# Patient Record
Sex: MALE | Race: ASIAN
[De-identification: names, ages, dates, MRNs, and addresses within clinical notes are randomized per-mention and may not be internally consistent; named-entity substitution may affect disease eponyms.]

---

## 2021-02-20 ENCOUNTER — HOSPITAL ENCOUNTER (EMERGENCY)
Dept: HOSPITAL 72 - EMR | Age: 66
Discharge: ANOTHER HOSPITAL | End: 2021-02-20
Payer: COMMERCIAL

## 2021-02-20 VITALS — DIASTOLIC BLOOD PRESSURE: 75 MMHG | SYSTOLIC BLOOD PRESSURE: 118 MMHG

## 2021-02-20 VITALS — SYSTOLIC BLOOD PRESSURE: 120 MMHG | DIASTOLIC BLOOD PRESSURE: 73 MMHG

## 2021-02-20 VITALS — BODY MASS INDEX: 20.04 KG/M2 | HEIGHT: 70 IN | WEIGHT: 140 LBS

## 2021-02-20 VITALS — DIASTOLIC BLOOD PRESSURE: 66 MMHG | SYSTOLIC BLOOD PRESSURE: 115 MMHG

## 2021-02-20 DIAGNOSIS — N13.8: Primary | ICD-10-CM

## 2021-02-20 DIAGNOSIS — N20.0: ICD-10-CM

## 2021-02-20 LAB
ADD MANUAL DIFF: NO
ALBUMIN SERPL-MCNC: 3.2 G/DL (ref 3.4–5)
ALBUMIN/GLOB SERPL: 0.8 {RATIO} (ref 1–2.7)
ALP SERPL-CCNC: 58 U/L (ref 46–116)
ALT SERPL-CCNC: 24 U/L (ref 12–78)
ANION GAP SERPL CALC-SCNC: 9 MMOL/L (ref 5–15)
APPEARANCE UR: (no result)
APTT PPP: (no result) S
AST SERPL-CCNC: 18 U/L (ref 15–37)
BASOPHILS NFR BLD AUTO: 0.8 % (ref 0–2)
BILIRUB SERPL-MCNC: 0.9 MG/DL (ref 0.2–1)
BUN SERPL-MCNC: 19 MG/DL (ref 7–18)
CALCIUM SERPL-MCNC: 8.7 MG/DL (ref 8.5–10.1)
CHLORIDE SERPL-SCNC: 107 MMOL/L (ref 98–107)
CO2 SERPL-SCNC: 28 MMOL/L (ref 21–32)
CREAT SERPL-MCNC: 1 MG/DL (ref 0.55–1.3)
EOSINOPHIL NFR BLD AUTO: 1.2 % (ref 0–3)
ERYTHROCYTE [DISTWIDTH] IN BLOOD BY AUTOMATED COUNT: 13.9 % (ref 11.6–14.8)
GLOBULIN SER-MCNC: 3.9 G/DL
GLUCOSE UR STRIP-MCNC: NEGATIVE MG/DL
HCT VFR BLD CALC: 41.7 % (ref 42–52)
HGB BLD-MCNC: 13.5 G/DL (ref 14.2–18)
KETONES UR QL STRIP: (no result)
LEUKOCYTE ESTERASE UR QL STRIP: (no result)
LYMPHOCYTES NFR BLD AUTO: 22.1 % (ref 20–45)
MCV RBC AUTO: 88 FL (ref 80–99)
MONOCYTES NFR BLD AUTO: 6 % (ref 1–10)
NEUTROPHILS NFR BLD AUTO: 70 % (ref 45–75)
NITRITE UR QL STRIP: NEGATIVE
PH UR STRIP: 6 [PH] (ref 4.5–8)
PLATELET # BLD: 259 K/UL (ref 150–450)
POTASSIUM SERPL-SCNC: 3.9 MMOL/L (ref 3.5–5.1)
PROT UR QL STRIP: (no result)
RBC # BLD AUTO: 4.75 M/UL (ref 4.7–6.1)
SODIUM SERPL-SCNC: 143 MMOL/L (ref 136–145)
SP GR UR STRIP: 1.02 (ref 1–1.03)
UROBILINOGEN UR-MCNC: 1 MG/DL (ref 0–1)
WBC # BLD AUTO: 11.7 K/UL (ref 4.8–10.8)

## 2021-02-20 PROCEDURE — 96374 THER/PROPH/DIAG INJ IV PUSH: CPT

## 2021-02-20 PROCEDURE — 99284 EMERGENCY DEPT VISIT MOD MDM: CPT

## 2021-02-20 PROCEDURE — 81003 URINALYSIS AUTO W/O SCOPE: CPT

## 2021-02-20 PROCEDURE — 83690 ASSAY OF LIPASE: CPT

## 2021-02-20 PROCEDURE — 85025 COMPLETE CBC W/AUTO DIFF WBC: CPT

## 2021-02-20 PROCEDURE — 96375 TX/PRO/DX INJ NEW DRUG ADDON: CPT

## 2021-02-20 PROCEDURE — 74177 CT ABD & PELVIS W/CONTRAST: CPT

## 2021-02-20 PROCEDURE — 36415 COLL VENOUS BLD VENIPUNCTURE: CPT

## 2021-02-20 PROCEDURE — 96376 TX/PRO/DX INJ SAME DRUG ADON: CPT

## 2021-02-20 PROCEDURE — 80053 COMPREHEN METABOLIC PANEL: CPT

## 2021-02-20 PROCEDURE — 96361 HYDRATE IV INFUSION ADD-ON: CPT

## 2021-02-20 NOTE — DIAGNOSTIC IMAGING REPORT
EXAM:

  CT Abdomen and Pelvis With Intravenous Contrast

 

CLINICAL HISTORY:

  ABD PAIN

 

TECHNIQUE:

  Axial computed tomography images of the abdomen and pelvis with 

intravenous contrast.  CTDI is 4.2 mGy and DLP is 209.7 mGy-cm.  One or 

more of the following dose reduction techniques were used: automated 

exposure control, adjustment of the mA and/or kV according to patient 

size, use of iterative reconstruction technique.

 

COMPARISON:

  No relevant prior studies available.

 

FINDINGS:

  Lung bases:  Atelectasis at the lung bases.  Cardiomegaly.

  Mediastinum:  Small hiatal hernia.

 

 ABDOMEN:

  Liver:  Moderate periportal edema, correlate with LFTs to exclude 

hepatitis.  This degree of periportal edema is unlikely secondary to 

overhydration.

  Gallbladder and bile ducts:  Unremarkable.  No calcified stones.  No 

ductal dilation.

  Pancreas:  Unremarkable.  No mass.  No ductal dilation.

  Spleen:  Unremarkable.  No splenomegaly.

  Adrenals:  Unremarkable.  No mass.

  Kidneys and ureters:  Obstructing 4 mm right proximal ureter calculus, 

located 4.3 cm distal to the right renal pelvis.  Mild delayed nephrogram 

of the right kidney with mild fullness of the right collecting system.  

Mild perinephric fluid for which mild forniceal rupture is not excluded.

 

  Nonobstructing 4 mm right lower pole renal calculus.  

 

  No left side hydronephrosis.  Left simple renal cyst measures 2.5 x 1.5 

cm.

 

  Stomach and bowel:  No small bowel obstruction.  No acute 

diverticulitis.

 

 PELVIS:

  Appendix:  No acute appendicitis.

  Bladder:  Unremarkable.  No mass.

  Reproductive:  Unremarkable as visualized.

 

 ABDOMEN and PELVIS:

  Intraperitoneal space:  Unremarkable.  No free air.  No significant 

fluid collection.

  Bones/joints:  Degenerative changes of the spine.  No acute fracture.  

No dislocation.

  Soft tissues:  Unremarkable.

  Vasculature:  Atherosclerotic calcifications of the aorta.  No 

abdominal aortic aneurysm.

  Lymph nodes:  Unremarkable.  No enlarged lymph nodes.

 

IMPRESSION:     

1.  Obstructing 4 mm RIGHT proximal ureter calculus, located 4.3 cm 

distal to the right renal pelvis.  Mild delayed nephrogram of the right 

kidney with mild fullness of the right collecting system.  Mild 

perinephric fluid for which mild forniceal rupture is not excluded.

 

     Nonobstructing 4 mm RIGHT lower pole renal calculus.  

 

2.  Moderate periportal edema, correlate with LFTs to exclude hepatitis.  

This degree of periportal edema is unlikely secondary to overhydration.

## 2021-02-20 NOTE — EMERGENCY ROOM REPORT
History of Present Illness


General


Chief Complaint:  Abdominal Pain


Source:  Patient





Present Illness


HPI


Disclaimer: Please note that this report is being documented using [employer] 

technology. This can lead to erroneous entry secondary to incorrect 

interpretation by the dictating instrument.





HPI: 65-year-old male no medical history presents for evaluation of abdominal 

pain.  He went to bed last night noted dark appearance to his urine without 

cloudiness or foul smell.  He denied flank pain or other discomfort at that 

time.  He awoke with right upper quadrant abdominal pain that does not radiate. 

No prior history of surgery.  Reports one episode of emesis prior to arrival.  

Denies fever diarrhea.  Denies dysuria.  Denies chest pain, palpitations, 

shortness of breath.  Does not take any medications.  Does not use drugs or 

alcohol.  Pain is getting worse.


 


PMH: Denied


 


PSH: Denied


 


Allergies: Denied


 


Social Hx: Denies drug, alcohol or tobacco use


Allergies:  


Coded Allergies:  


     No Known Allergies (Unverified , 2/20/21)





COVID-19 Screening


Contact w/high risk pt:  No


Experienced COVID-19 symptoms?:  No


COVID-19 Testing performed PTA:  No





Nursing Documentation-PMH


Past Medical History:  No Stated History





Review of Systems


All Other Systems:  negative except mentioned in HPI





Physical Exam





Vital Signs








  Date Time  Temp Pulse Resp B/P (MAP) Pulse Ox O2 Delivery O2 Flow Rate FiO2


 


2/20/21 07:21 98.2 65 17 129/77 (94) 96 Room Air  





 





General: Awake and alert, appears uncomfortable


HEENT: NC/AT. EOMI. 


Cardiovascular: RRR.  S1 and S2 normal.  No murmur appreciated


Resp: Normal work of breathing. No cough, wheezing or crackles appreciated


Abdomen: Abdomen is soft, nondistended.  Tender palpation of the right upper 

quadrant with positive Gomez's tenderness and mild epigastric tenderness.  No 

tenderness in the left upper quadrant, right lower quadrant, left lower quadrant

or suprapubic or periumbilical region.  No CVA tenderness.


Skin: Intact.  No abrasions, laceration or rash over the exposed skin


MSK: Normal tone and bulk. Moving all extremities.  No obvious deformity.


Neuro: Awake and alert.  Mentating appropriately.





Medical Decision Making


Diagnostic Impression:  


   Primary Impression:  


   Obstructive nephropathy


   Additional Impression:  


   Nephrolithiasis


ER Course


65-year-old male presenting with abdominal pain.  Differential includes is not 

limited to gastritis, gastroenteritis, pancreatitis, cholecystitis, 

choledocholithiasis, nephrolithiasis, appendicitis, bowel obstruction, UTI, 

pyelonephritis, mesenteric ischemia among others.  IV fluids, antiemetics, pain 

medication started.  Patient placed on monitor. CT scan concerning for obstruct

kayley uropathy secondary to 4 mm stone in the right ureter. Hydronephrosis seen. 

Patient require admission. Additional antiemetics and pain medication provided. 

Given tamsulosin. Urine does not appear infectious. Patient will be transferred 

to [facility]. Dr. Torres accepting physician.





Laboratory Tests








Test


 2/20/21


07:20 2/20/21


08:00


 


White Blood Count


 11.7 K/UL


(4.8-10.8)  H 





 


Red Blood Count


 4.75 M/UL


(4.70-6.10) 





 


Hemoglobin


 13.5 G/DL


(14.2-18.0)  L 





 


Hematocrit


 41.7 %


(42.0-52.0)  L 





 


Mean Corpuscular Volume 88 FL (80-99)   


 


Mean Corpuscular Hemoglobin


 28.5 PG


(27.0-31.0) 





 


Mean Corpuscular Hemoglobin


Concent 32.4 G/DL


(32.0-36.0) 





 


Red Cell Distribution Width


 13.9 %


(11.6-14.8) 





 


Platelet Count


 259 K/UL


(150-450) 





 


Mean Platelet Volume


 7.0 FL


(6.5-10.1) 





 


Neutrophils (%) (Auto)


 70.0 %


(45.0-75.0) 





 


Lymphocytes (%) (Auto)


 22.1 %


(20.0-45.0) 





 


Monocytes (%) (Auto)


 6.0 %


(1.0-10.0) 





 


Eosinophils (%) (Auto)


 1.2 %


(0.0-3.0) 





 


Basophils (%) (Auto)


 0.8 %


(0.0-2.0) 





 


Sodium Level


 143 MMOL/L


(136-145) 





 


Potassium Level


 3.9 MMOL/L


(3.5-5.1) 





 


Chloride Level


 107 MMOL/L


() 





 


Carbon Dioxide Level


 28 MMOL/L


(21-32) 





 


Anion Gap


 9 mmol/L


(5-15) 





 


Blood Urea Nitrogen


 19 mg/dL


(7-18)  H 





 


Creatinine


 1.0 MG/DL


(0.55-1.30) 





 


Estimated Glomerular


Filtration Rate > 60 mL/min


(>60) 





 


Glucose Level


 159 MG/DL


()  H 





 


Calcium Level


 8.7 MG/DL


(8.5-10.1) 





 


Total Bilirubin


 0.9 MG/DL


(0.2-1.0) 





 


Aspartate Amino Transferase


(AST) 18 U/L (15-37)


 





 


Alanine Aminotransferase (ALT)


 24 U/L (12-78)


 





 


Alkaline Phosphatase


 58 U/L


() 





 


Total Protein


 7.1 G/DL


(6.4-8.2) 





 


Albumin


 3.2 G/DL


(3.4-5.0)  L 





 


Globulin 3.9 g/dL   


 


Albumin/Globulin Ratio


 0.8 (1.0-2.7)


L 





 


Lipase


 132 U/L


() 





 


Urine Color  Brown  


 


Urine Appearance


 


 Slightly


cloudy


 


Urine pH  6 (4.5-8.0)  


 


Urine Specific Gravity


 


 1.020


(1.005-1.035)


 


Urine Protein


 


 3+ (NEGATIVE)


H


 


Urine Glucose (UA)


 


 Negative


(NEGATIVE)


 


Urine Ketones


 


 1+ (NEGATIVE)


H


 


Urine Blood


 


 5+ (NEGATIVE)


H


 


Urine Nitrite


 


 Negative


(NEGATIVE)


 


Urine Bilirubin


 


 Negative


(NEGATIVE)


 


Urine Urobilinogen


 


 1 MG/DL


(0.0-1.0)  H


 


Urine Leukocyte Esterase


 


 1+ (NEGATIVE)


H


 


Urine RBC


 


 Tntc /HPF (0 -


0)  H


 


Urine WBC


 


 2-4 /HPF (0 -


0)


 


Urine Squamous Epithelial


Cells 


 Occasional


/LPF


 


Urine Bacteria


 


 Few /HPF


(NONE)








CT/MRI/US Diagnostic Results


CT/MRI/US Diagnostic Results :  


   Impression


Final Report


EXAM:


CT Abdomen and Pelvis With Intravenous Contrast





CLINICAL HISTORY:


ABD PAIN





TECHNIQUE:


Axial computed tomography images of the abdomen and pelvis with intravenous 

contrast. CTDI is 4.2 mGy and DLP is 209.7 mGy-cm. One or more of the following 

dose reduction techniques were used: automated exposure control, adjustment of 

the mA and/or kV according to patient size, use of iterative reconstruction 

technique.





COMPARISON:


No relevant prior studies available.





FINDINGS:


Lung bases: Atelectasis at the lung bases. Cardiomegaly.


Mediastinum: Small hiatal hernia.





ABDOMEN:


Liver: Moderate periportal edema, correlate with LFTs to exclude hepatitis. This

degree of periportal edema is unlikely secondary to overhydration.


Gallbladder and bile ducts: Unremarkable. No calcified stones. No ductal 

dilation.


Pancreas: Unremarkable. No mass. No ductal dilation.


Spleen: Unremarkable. No splenomegaly.


Adrenals: Unremarkable. No mass.


Kidneys and ureters: Obstructing 4 mm right proximal ureter calculus, located 

4.3 cm distal to the right renal pelvis. Mild delayed nephrogram of the right 

kidney with mild fullness of the right collecting system. Mild perinephric fluid

for which mild forniceal rupture is not excluded.





Nonobstructing 4 mm right lower pole renal calculus.





No left side hydronephrosis. Left simple renal cyst measures 2.5 x 1.5 cm.





Stomach and bowel: No small bowel obstruction. No acute diverticulitis.





PELVIS:


Appendix: No acute appendicitis.


Bladder: Unremarkable. No mass.


Reproductive: Unremarkable as visualized.





ABDOMEN and PELVIS:


Intraperitoneal space: Unremarkable. No free air. No significant fluid 

collection.


Bones/joints: Degenerative changes of the spine. No acute fracture. No 

dislocation.


Soft tissues: Unremarkable.


Vasculature: Atherosclerotic calcifications of the aorta. No abdominal aortic 

aneurysm.


Lymph nodes: Unremarkable. No enlarged lymph nodes.





IMPRESSION:


1. Obstructing 4 mm RIGHT proximal ureter calculus, located 4.3 cm distal to the

right renal pelvis. Mild delayed nephrogram of the right kidney with mild 

fullness of the right collecting system. Mild perinephric fluid for which mild 

forniceal rupture is not excluded.





Nonobstructing 4 mm RIGHT lower pole renal calculus.





2. Moderate periportal edema, correlate with LFTs to exclude hepatitis. This 

degree of periportal edema is unlikely secondary to overhydration.





Radiologist: Kyle Hines MD   Electronically Signed: 02/20/21 10:21   Phone: 

[phone]


Study ready at 09:36 and initial results transmitted at 10:21





Last Vital Signs








  Date Time  Temp Pulse Resp B/P (MAP) Pulse Ox O2 Delivery O2 Flow Rate FiO2


 


2/20/21 07:32  65 17   Room Air  


 


2/20/21 07:32 98.2   118/75 96   








Disposition:  SHORT-TERM HOSP


Condition:  Stable


Scripts


No Active Prescriptions or Reported Meds











Abilio Iqbal MD              Feb 20, 2021 07:41

## 2021-02-20 NOTE — NUR
ED Nurse Note:pt. was BIBA from [place] by [id number] with abdominal pain, VSS, pt. is 
A/Ox4, blood was sent to labs, given IV meds, pt. had 1 episode of vomiting